# Patient Record
Sex: MALE | Race: WHITE | NOT HISPANIC OR LATINO | Employment: STUDENT | URBAN - METROPOLITAN AREA
[De-identification: names, ages, dates, MRNs, and addresses within clinical notes are randomized per-mention and may not be internally consistent; named-entity substitution may affect disease eponyms.]

---
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
## 2022-03-22 NOTE — TELEPHONE ENCOUNTER
I left a brief message confirming arrival time and time of surgery, also reminder no eating or drinking after midnight, and to please call the office to confirm.   intact

## 2022-12-08 NOTE — PLAN OF CARE
Updated mother, father, and pt on POC and unit guidelines- verbalized understanding with no further questions. Pt has been awake and oriented since 1700. On morphine PCA. Tylenol ATC.  Robaxin q6h.  Toradol prn x1. Ancef q8h. A-line much higher than cuffs- ok to go off cuff pressures. Back incision WNL. Accordian drain putting out scant blood. Large raised rash noted to forehead and other reddened rashes noted to left lower abdomen, left knee, and right chest.  Morgan in place. Taking water/apple juice. See doc flowsheets for further details. Will cont to monitor closely.   Nosebleeds Normal Treatment: I explained this is common when taking isotretinoin. I recommended saline mist in each nostril multiple times a day. If this worsens they will contact us.

## (undated) DEVICE — NDL ECLIPSE SAFETY 18GX1-1/2IN

## (undated) DEVICE — SUCTION FRAZIER TIP SURG 12FR

## (undated) DEVICE — DRESSING AQUACEL FOAM 5 X 5

## (undated) DEVICE — DRAPE STERI-DRAPE 1000 17X11IN

## (undated) DEVICE — DRAPE STERI INSTRUMENT 1018

## (undated) DEVICE — DRESSING MEPILEX BORDER 4 X 4

## (undated) DEVICE — SEALER AQUAMANTYS 2.3 BIPOLAR

## (undated) DEVICE — ELECTRODE REM PLYHSV RETURN 9

## (undated) DEVICE — DRESSING AQUACEL SACRAL 9 X 9

## (undated) DEVICE — KIT EVACUATOR 3-SPRING 1/8 DRN

## (undated) DEVICE — BLADE MILL BONE MEDIUM

## (undated) DEVICE — DRESSING TRANS 8X12 TEGADERM

## (undated) DEVICE — MARKER SKIN STND TIP BLUE BARR

## (undated) DEVICE — SEE MEDLINE ITEM 156905

## (undated) DEVICE — DRESSING AQUACEL FOAM NON 4X4

## (undated) DEVICE — DRAPE C-ARMOR EQUIPMENT COVER

## (undated) DEVICE — SET DECANTER MEDICHOICE

## (undated) DEVICE — TRAY FOLEY 16FR INFECTION CONT

## (undated) DEVICE — BUR BONE CUT MICRO TPS 3X3.8MM

## (undated) DEVICE — DRESSING AQUACEL FOAM 4 X 12

## (undated) DEVICE — KIT IRR SUCTION HND PIECE

## (undated) DEVICE — CORD BIPOLAR 12 FOOT

## (undated) DEVICE — ADAPTER TUBING 18G

## (undated) DEVICE — SUT VICRYL+ 1 CT1 18IN

## (undated) DEVICE — CONTAINER SPECIMEN STRL 4OZ

## (undated) DEVICE — DRESSING TRANS 4X4 TEGADERM

## (undated) DEVICE — SYS PRINEO SKIN CLOSURE

## (undated) DEVICE — SOL IRR NACL .9% 3000ML

## (undated) DEVICE — DRESSING AQUACEL FOAM 3 X 3

## (undated) DEVICE — GAUZE SPONGE 4X4 12PLY

## (undated) DEVICE — DURAPREP SURG SCRUB 26ML

## (undated) DEVICE — BLANKET LOWER BODY 55.9X40.2IN

## (undated) DEVICE — SPONGE LAP 18X18 PREWASHED

## (undated) DEVICE — SEE MEDLINE ITEM 157150

## (undated) DEVICE — SEE MEDLINE ITEM 157148

## (undated) DEVICE — DIFFUSER

## (undated) DEVICE — KIT SPINAL PATIENT CARE JACK

## (undated) DEVICE — DRAPE C ARM 42 X 120 10/BX

## (undated) DEVICE — TAP 6MM SPINAL POWER

## (undated) DEVICE — BOVIE SUCTION

## (undated) DEVICE — DRESSING AQUACEL SACRAL LARGE

## (undated) DEVICE — SOL NACL 0.45% 1000ML BG